# Patient Record
Sex: MALE | Employment: OTHER | URBAN - METROPOLITAN AREA
[De-identification: names, ages, dates, MRNs, and addresses within clinical notes are randomized per-mention and may not be internally consistent; named-entity substitution may affect disease eponyms.]

---

## 2019-02-21 PROBLEM — Z79.4 TYPE 2 DIABETES MELLITUS WITH MICROALBUMINURIA, WITH LONG-TERM CURRENT USE OF INSULIN (HCC): Status: ACTIVE | Noted: 2019-02-21

## 2019-02-21 PROBLEM — R80.9 TYPE 2 DIABETES MELLITUS WITH MICROALBUMINURIA, WITH LONG-TERM CURRENT USE OF INSULIN (HCC): Status: ACTIVE | Noted: 2019-02-21

## 2019-02-21 PROBLEM — E11.29 TYPE 2 DIABETES MELLITUS WITH MICROALBUMINURIA, WITH LONG-TERM CURRENT USE OF INSULIN (HCC): Status: ACTIVE | Noted: 2019-02-21

## 2019-07-24 PROBLEM — H40.89 OTHER SPECIFIED GLAUCOMA: Status: ACTIVE | Noted: 2019-07-24

## 2019-07-24 PROBLEM — S72.91XA CLOSED FRACTURE OF RIGHT FEMUR (HCC): Status: ACTIVE | Noted: 2019-07-24

## 2019-07-24 PROBLEM — E11.65 TYPE 2 DIABETES MELLITUS WITH HYPERGLYCEMIA, WITH LONG-TERM CURRENT USE OF INSULIN (HCC): Status: ACTIVE | Noted: 2019-02-21

## 2019-07-24 PROBLEM — Z79.4 TYPE 2 DIABETES MELLITUS WITH HYPERGLYCEMIA, WITH LONG-TERM CURRENT USE OF INSULIN (HCC): Status: ACTIVE | Noted: 2019-02-21

## 2019-07-24 PROBLEM — E03.8 OTHER SPECIFIED HYPOTHYROIDISM: Status: ACTIVE | Noted: 2019-07-24

## 2019-07-24 PROBLEM — I10 ESSENTIAL HYPERTENSION: Status: ACTIVE | Noted: 2019-07-24

## 2020-01-18 PROBLEM — R07.9 CHEST PAIN, UNSPECIFIED TYPE: Status: ACTIVE | Noted: 2020-01-18

## 2020-03-20 PROBLEM — I73.9 PERIPHERAL ARTERY DISEASE: Status: ACTIVE | Noted: 2020-03-20

## 2020-03-20 PROBLEM — L97.511 ULCER OF FOOT, RIGHT, LIMITED TO BREAKDOWN OF SKIN (HCC): Status: ACTIVE | Noted: 2020-03-20

## 2020-03-20 PROBLEM — L97.909 ATHEROSCLEROSIS OF ARTERY OF EXTREMITY WITH ULCERATION (HCC): Status: ACTIVE | Noted: 2020-03-20

## 2020-03-20 PROBLEM — E11.51 TYPE 2 DIABETES MELLITUS WITH PERIPHERAL ANGIOPATHY (HCC): Status: ACTIVE | Noted: 2020-03-20

## 2020-03-20 PROBLEM — I73.9 PERIPHERAL ARTERY DISEASE (HCC): Status: ACTIVE | Noted: 2020-03-20

## 2020-03-20 PROBLEM — I70.299 ATHEROSCLEROSIS OF ARTERY OF EXTREMITY WITH ULCERATION (HCC): Status: ACTIVE | Noted: 2020-03-20

## 2020-03-23 ENCOUNTER — OFFICE VISIT (OUTPATIENT)
Dept: WOUND CARE | Facility: HOSPITAL | Age: 80
End: 2020-03-23
Attending: SURGERY
Payer: MEDICARE

## 2020-03-23 DIAGNOSIS — L97.412 DIABETIC ULCER OF RIGHT MIDFOOT ASSOCIATED WITH TYPE 2 DIABETES MELLITUS, WITH FAT LAYER EXPOSED (HCC): ICD-10-CM

## 2020-03-23 DIAGNOSIS — E11.621 DIABETIC ULCER OF RIGHT MIDFOOT ASSOCIATED WITH TYPE 2 DIABETES MELLITUS, WITH FAT LAYER EXPOSED (HCC): ICD-10-CM

## 2020-03-23 DIAGNOSIS — L97.511 ULCER OF FOOT, RIGHT, LIMITED TO BREAKDOWN OF SKIN (HCC): Primary | ICD-10-CM

## 2020-03-23 PROCEDURE — 99215 OFFICE O/P EST HI 40 MIN: CPT

## 2020-03-23 NOTE — PROGRESS NOTES
Subjective    Chief Complaint  This information was obtained from the patient  The patient is new to the [address] [facility],[address] here for an initial visit for the evaluation and management of non-healing wound(s).     Allergies  Statins-Hmg-Coa Reductase Inhibitors (R Vascular surgery Dr. Gracie Fairbanks noted the discussion with patient and his son, Tiffany Garcias unfortunately has no bypass options given that he has no outflow or major named vessels in his foot. Raz Camacho If the wound progresses or his pain is unbearable, unfortunately his only Psychiatric: Mental Illness, Memory Loss, Suicidal    Additional Information  Little interest or pleasure in doing things (over the last 2 weeks)?: 0  Feeling down, depressed, or hopeless (over the last 2 weeks)?: 0  Total score: : 0    Medications  diazep The periwound skin did not exhibit: Brawny Induration, Edema, Excoriation, Induration, Callus, Crepitus, Fluctuance, Friable, Rash, Dry/Scaly, Moist, Maceration, Atrophie Chayo, Cyanosis, Ecchymosis, Erythema, Hemosiderosis, Pallor, Rubor.  The temperatur Gastrointestinal (GI):  Positive bowel sounds. Soft, nondistended, nontender. No guarding . Lymphatic:  no palpable lymph nodes in neck or axillae. Musculoskeletal:  Normal gait.  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 Increase dietary protein intake. Exercise as tolerated. Decrease salt intake. Moisturizer.   S/S of infection - monitor for S&S of soft tissue infection such as fever, chills, erythema, inc Discussed nutrition for wound healing and encourage reduce carbohydrate intake, increase protein intake, and low calorie diet. Discussed reducing salt intake, food with hidden salt, and management of the body fluid to prevent lower extremity edema.   Discu

## 2020-03-25 ENCOUNTER — APPOINTMENT (OUTPATIENT)
Dept: WOUND CARE | Facility: HOSPITAL | Age: 80
End: 2020-03-25
Attending: NURSE PRACTITIONER

## 2020-04-02 ENCOUNTER — OFFICE VISIT (OUTPATIENT)
Dept: WOUND CARE | Facility: HOSPITAL | Age: 80
End: 2020-04-02
Attending: NURSE PRACTITIONER
Payer: MEDICARE

## 2020-04-02 DIAGNOSIS — L97.412 DIABETIC ULCER OF RIGHT MIDFOOT ASSOCIATED WITH TYPE 2 DIABETES MELLITUS, WITH FAT LAYER EXPOSED (HCC): ICD-10-CM

## 2020-04-02 DIAGNOSIS — E11.621 DIABETIC ULCER OF RIGHT MIDFOOT ASSOCIATED WITH TYPE 2 DIABETES MELLITUS, WITH FAT LAYER EXPOSED (HCC): ICD-10-CM

## 2020-04-02 DIAGNOSIS — L97.511 ULCER OF FOOT, RIGHT, LIMITED TO BREAKDOWN OF SKIN (HCC): Primary | ICD-10-CM

## 2020-04-02 PROCEDURE — 11055 PARING/CUTG B9 HYPRKER LES 1: CPT

## 2020-04-02 NOTE — PROGRESS NOTES
Subjective    Chief Complaint  This information was obtained from the patient  The patient was seen today for follow up and management of difficult to heal wound rioght foot and great toe wounds.     Allergies  Statins-Hmg-Coa Reductase Inhibitors (Reaction Vascular surgery Dr. Marshall Snowden noted the discussion with patient and his son, Shelby Guo unfortunately has no bypass options given that he has no outflow or major named vessels in his foot. Meli Lopez If the wound progresses or his pain is unbearable, unfortunately his only Wound #1 Right, Lateral, Plantar Foot is a Plata Grade 2 Diabetic Ulcer and has received a status of Not Healed.  Subsequent wound encounter measurements are 0.2cm length x 0.2cm width x 0.1cm depth, with an area of 0.04 sq cm and a volume of 0.004 cubic c Constitutional  well developed, no acute distress. Respiratory:  effortless breathing. Cardiovascular:  BLE's with no edema. Musculoskeletal:  no clubbing, no cyanosis of digits and nails.     Integumentary (Hair, Skin)  Skin normal color, texture Wound #1 (Diabetic Ulcer) is located on the right, lateral, plantar foot. A Single Paring Of Callus/Hyperkeratotic Tissue procedure was performed by Jeris Cowden, FNP-C. Pain control was achieved using N/A.  A time out was conducted prior to the start of t Patient Name: Angela Salvador   Patient Number: [id number]  Patient YOB: 1940  Date: 4/2/2020  RN: Elizabeth Raya  Physician / Extender: Sanna Pompa  Procedure Performed for: Wound #1 Right, Lateral, Plantar Foot  Performed By: Physician Calista Rinne

## 2020-04-16 ENCOUNTER — OFFICE VISIT (OUTPATIENT)
Dept: WOUND CARE | Facility: HOSPITAL | Age: 80
End: 2020-04-16
Attending: NURSE PRACTITIONER
Payer: MEDICARE

## 2020-04-16 DIAGNOSIS — L97.412 DIABETIC ULCER OF RIGHT MIDFOOT ASSOCIATED WITH TYPE 2 DIABETES MELLITUS, WITH FAT LAYER EXPOSED (HCC): Primary | ICD-10-CM

## 2020-04-16 DIAGNOSIS — E11.621 DIABETIC ULCER OF RIGHT MIDFOOT ASSOCIATED WITH TYPE 2 DIABETES MELLITUS, WITH FAT LAYER EXPOSED (HCC): Primary | ICD-10-CM

## 2020-04-16 PROCEDURE — 99213 OFFICE O/P EST LOW 20 MIN: CPT

## 2020-04-16 NOTE — PROGRESS NOTES
Subjective    Chief Complaint  This information was obtained from the patient  The patient was seen today for follow up and management of difficult to heal wound right foot and great toe wounds.     Allergies  Statins-Hmg-Coa Reductase Inhibitors (Reaction: Vascular surgery Dr. Clarissa Pretty noted the discussion with patient and his son, Montserrat Moon unfortunately has no bypass options given that he has no outflow or major named vessels in his foot. Amanda Cantu If the wound progresses or his pain is unbearable, unfortunately his only Wound #1 Right, Lateral, Plantar Foot is a Plata Grade 2 Diabetic Ulcer and has received a status of Not Healed.  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 Wound Treatment Note  Assessed patient’s pain status and effectiveness

## 2020-04-30 ENCOUNTER — APPOINTMENT (OUTPATIENT)
Dept: WOUND CARE | Facility: HOSPITAL | Age: 80
End: 2020-04-30
Payer: MEDICARE

## 2020-05-14 ENCOUNTER — OFFICE VISIT (OUTPATIENT)
Dept: WOUND CARE | Facility: HOSPITAL | Age: 80
End: 2020-05-14
Attending: NURSE PRACTITIONER
Payer: MEDICARE

## 2020-05-14 DIAGNOSIS — E11.621 DIABETIC ULCER OF RIGHT MIDFOOT ASSOCIATED WITH TYPE 2 DIABETES MELLITUS, WITH FAT LAYER EXPOSED (HCC): Primary | ICD-10-CM

## 2020-05-14 DIAGNOSIS — L97.412 DIABETIC ULCER OF RIGHT MIDFOOT ASSOCIATED WITH TYPE 2 DIABETES MELLITUS, WITH FAT LAYER EXPOSED (HCC): Primary | ICD-10-CM

## 2020-05-14 PROCEDURE — 99213 OFFICE O/P EST LOW 20 MIN: CPT

## 2020-05-14 NOTE — PROGRESS NOTES
Subjective    Chief Complaint  This information was obtained from the patient  The patient was seen today for follow up and management of difficult to heal wound right foot wound. No concerns.     Allergies  Statins-Hmg-Coa Reductase Inhibitors (Reaction: I Vascular surgery Dr. Jeffery Kirk noted the discussion with patient and his son, Rosa Davison unfortunately has no bypass options given that he has no outflow or major named vessels in his foot. Binu Roughen Binu Jossue Binuronan Marcum If the wound progresses or his pain is unbearable, unfortunately his only Height/Length: 64 in (162.56 cm), Weight: 120 lbs (54.55 kgs), BMI: 20.6, Temperature: 96.6 °F (35.89 °C), Pulse: 59 bpm, Respiratory Rate: 18 breaths/min, Blood Pressure: 161/63 mmHg, Pulse Oximetry: 100 %.   Vitals Signs Notes: patient did not check his b Moisturizer.  - legs daily  S/S of infection    Electronic Signature(s)  Signed By: Date:  Jeris Cowden FNP-BC 05/14/2020 3:14:11 PM       Entered By: Jeris Cowden on 05/14/2020 3:13:43 PM  Treatment Notes Summary  Wound #1 (Right, Lateral, Plantar Foot)

## 2020-06-11 ENCOUNTER — OFFICE VISIT (OUTPATIENT)
Dept: WOUND CARE | Facility: HOSPITAL | Age: 80
End: 2020-06-11
Attending: NURSE PRACTITIONER
Payer: MEDICARE

## 2020-06-11 DIAGNOSIS — E11.621 DIABETIC ULCER OF RIGHT MIDFOOT ASSOCIATED WITH TYPE 2 DIABETES MELLITUS, WITH FAT LAYER EXPOSED (HCC): Primary | ICD-10-CM

## 2020-06-11 DIAGNOSIS — L97.412 DIABETIC ULCER OF RIGHT MIDFOOT ASSOCIATED WITH TYPE 2 DIABETES MELLITUS, WITH FAT LAYER EXPOSED (HCC): Primary | ICD-10-CM

## 2020-06-11 PROCEDURE — 99212 OFFICE O/P EST SF 10 MIN: CPT

## 2020-06-11 NOTE — PROGRESS NOTES
Subjective    Chief Complaint  This information was obtained from the patient  The patient was seen today for a consult of right great toenail pain.     Allergies  Statins-Hmg-Coa Reductase Inhibitors (Reaction: Itching and Swelling), Sulfa antibiotics    H posterior tibial artery is now patent post intervention where visualized with a increase in the great toe pressure (previous: 38mmHg and TBI: 0.17) compared to the exam done on 02/25/2020.     Vascular surgery Dr. Neal Patel noted the discussion with patient a Gastrointestinal (GI): Loss of Appetite, Nausea / Vomiting / Diarrhea (N/V/D)  Genitourinary (): Urinary Incontinence  Musculoskeletal: Muscle Weakness  Integumentary (Hair/Skin/Nails): Itching  Neurological: Loss of Protective Sensation, Memory Loss  En Appropriate judgement and insight. Oriented to time, place and person. Appropriate mood and affect.         Assessment    Active Problems    ICD-10  (Encounter Diagnosis) E11.621 - Type 2 diabetes mellitus with foot ulcer  (Encounter Diagnosis) I70.234 - At

## 2020-06-22 PROBLEM — R26.81 UNSTEADY GAIT WHEN WALKING: Status: ACTIVE | Noted: 2020-06-22

## 2020-06-22 PROBLEM — E87.1 HYPONATREMIA: Status: ACTIVE | Noted: 2020-06-22

## 2020-09-02 PROBLEM — E11.69 MIXED HYPERLIPIDEMIA DUE TO TYPE 2 DIABETES MELLITUS (HCC): Status: ACTIVE | Noted: 2020-09-02

## 2020-09-02 PROBLEM — E78.2 MIXED HYPERLIPIDEMIA DUE TO TYPE 2 DIABETES MELLITUS (HCC): Status: ACTIVE | Noted: 2020-09-02

## 2020-09-09 PROBLEM — L97.412 DIABETIC ULCER OF RIGHT MIDFOOT ASSOCIATED WITH TYPE 2 DIABETES MELLITUS, WITH FAT LAYER EXPOSED (HCC): Status: RESOLVED | Noted: 2020-03-23 | Resolved: 2020-09-09

## 2020-09-09 PROBLEM — S72.91XA CLOSED FRACTURE OF RIGHT FEMUR (HCC): Status: RESOLVED | Noted: 2019-07-24 | Resolved: 2020-09-09

## 2020-09-09 PROBLEM — E11.621 DIABETIC ULCER OF RIGHT MIDFOOT ASSOCIATED WITH TYPE 2 DIABETES MELLITUS, WITH FAT LAYER EXPOSED (HCC): Status: RESOLVED | Noted: 2020-03-23 | Resolved: 2020-09-09

## 2020-09-09 PROBLEM — Z00.00 ROUTINE GENERAL MEDICAL EXAMINATION AT A HEALTH CARE FACILITY: Status: ACTIVE | Noted: 2020-09-09

## 2020-09-09 PROBLEM — L97.511 ULCER OF FOOT, RIGHT, LIMITED TO BREAKDOWN OF SKIN (HCC): Status: RESOLVED | Noted: 2020-03-20 | Resolved: 2020-09-09

## 2020-11-03 PROBLEM — E78.5 HYPERLIPIDEMIA, UNSPECIFIED HYPERLIPIDEMIA TYPE: Status: ACTIVE | Noted: 2020-11-03

## 2020-11-03 PROBLEM — E11.51 TYPE 2 DIABETES MELLITUS WITH PERIPHERAL ANGIOPATHY (HCC): Status: RESOLVED | Noted: 2020-03-20 | Resolved: 2020-11-03

## 2020-12-13 PROBLEM — R80.9 TYPE 2 DIABETES MELLITUS WITH MICROALBUMINURIA (HCC): Status: ACTIVE | Noted: 2020-12-13

## 2020-12-13 PROBLEM — E11.29 TYPE 2 DIABETES MELLITUS WITH MICROALBUMINURIA (HCC): Status: ACTIVE | Noted: 2020-12-13

## 2020-12-13 PROBLEM — E11.51 TYPE 2 DIABETES MELLITUS WITH DIABETIC PERIPHERAL ANGIOPATHY WITHOUT GANGRENE (HCC): Status: ACTIVE | Noted: 2020-12-13

## 2020-12-14 PROBLEM — R53.1 WEAKNESS: Status: ACTIVE | Noted: 2020-12-14

## 2020-12-17 PROBLEM — E11.69 MIXED HYPERLIPIDEMIA DUE TO TYPE 2 DIABETES MELLITUS (HCC): Status: RESOLVED | Noted: 2020-09-02 | Resolved: 2020-12-17

## 2020-12-17 PROBLEM — E78.2 MIXED HYPERLIPIDEMIA DUE TO TYPE 2 DIABETES MELLITUS (HCC): Status: RESOLVED | Noted: 2020-09-02 | Resolved: 2020-12-17

## 2020-12-17 PROBLEM — E11.51 TYPE 2 DIABETES MELLITUS WITH DIABETIC PERIPHERAL ANGIOPATHY WITHOUT GANGRENE (HCC): Status: RESOLVED | Noted: 2020-12-13 | Resolved: 2020-12-17

## 2020-12-17 PROBLEM — R80.9 TYPE 2 DIABETES MELLITUS WITH MICROALBUMINURIA (HCC): Status: RESOLVED | Noted: 2020-12-13 | Resolved: 2020-12-17

## 2020-12-17 PROBLEM — R80.9 MODERATELY INCREASED ALBUMINURIA: Status: ACTIVE | Noted: 2020-12-17

## 2020-12-17 PROBLEM — E11.29 TYPE 2 DIABETES MELLITUS WITH MICROALBUMINURIA (HCC): Status: RESOLVED | Noted: 2020-12-13 | Resolved: 2020-12-17

## 2021-03-01 PROBLEM — E03.9 HYPOTHYROIDISM, UNSPECIFIED TYPE: Status: ACTIVE | Noted: 2019-07-24

## 2021-03-01 PROBLEM — H10.33 ACUTE CONJUNCTIVITIS OF BOTH EYES, UNSPECIFIED ACUTE CONJUNCTIVITIS TYPE: Status: ACTIVE | Noted: 2021-03-01

## 2021-03-01 PROBLEM — I73.9 PAD (PERIPHERAL ARTERY DISEASE): Status: ACTIVE | Noted: 2020-03-20

## 2021-03-01 PROBLEM — I73.9 PAD (PERIPHERAL ARTERY DISEASE) (HCC): Status: ACTIVE | Noted: 2020-03-20

## 2021-03-01 PROBLEM — H90.3 SENSORINEURAL HEARING LOSS (SNHL) OF BOTH EARS: Status: ACTIVE | Noted: 2021-03-01

## 2021-03-05 DIAGNOSIS — Z23 NEED FOR VACCINATION: ICD-10-CM

## 2021-04-28 PROBLEM — R53.83 FATIGUE, UNSPECIFIED TYPE: Status: ACTIVE | Noted: 2020-12-14

## 2021-04-28 PROBLEM — K59.00 CONSTIPATION, UNSPECIFIED CONSTIPATION TYPE: Status: ACTIVE | Noted: 2021-04-28

## 2021-06-09 PROBLEM — Z09 HOSPITAL DISCHARGE FOLLOW-UP: Status: ACTIVE | Noted: 2021-06-09

## 2021-09-30 PROBLEM — M54.50 CHRONIC MIDLINE LOW BACK PAIN WITHOUT SCIATICA: Status: ACTIVE | Noted: 2021-09-30

## 2021-09-30 PROBLEM — E46 PROTEIN-CALORIE MALNUTRITION, UNSPECIFIED SEVERITY (HCC): Status: ACTIVE | Noted: 2021-09-30

## 2021-09-30 PROBLEM — Z00.00 ROUTINE GENERAL MEDICAL EXAMINATION AT A HEALTH CARE FACILITY: Status: RESOLVED | Noted: 2020-09-09 | Resolved: 2021-09-30

## 2021-09-30 PROBLEM — E55.9 VITAMIN D DEFICIENCY: Status: ACTIVE | Noted: 2021-09-30

## 2021-09-30 PROBLEM — G89.29 CHRONIC MIDLINE LOW BACK PAIN WITHOUT SCIATICA: Status: ACTIVE | Noted: 2021-09-30

## 2021-12-06 PROBLEM — R80.9 MICROALBUMINURIA: Status: ACTIVE | Noted: 2020-12-17

## 2022-02-24 PROBLEM — D64.9 ANEMIA, UNSPECIFIED TYPE: Status: ACTIVE | Noted: 2022-02-24

## 2023-05-30 ENCOUNTER — HOSPITAL ENCOUNTER (OUTPATIENT)
Dept: INTERVENTIONAL RADIOLOGY/VASCULAR | Facility: HOSPITAL | Age: 83
Discharge: HOME | End: 2023-05-30
Attending: SURGERY | Admitting: SURGERY
Payer: MEDICARE

## 2023-05-30 VITALS
RESPIRATION RATE: 14 BRPM | HEART RATE: 66 BPM | DIASTOLIC BLOOD PRESSURE: 47 MMHG | SYSTOLIC BLOOD PRESSURE: 179 MMHG | TEMPERATURE: 97 F | OXYGEN SATURATION: 98 %

## 2023-05-30 DIAGNOSIS — I73.9 PVD (PERIPHERAL VASCULAR DISEASE) (HCC): ICD-10-CM

## 2023-05-30 LAB — GLUCOSE BLD-MCNC: 93 MG/DL (ref 70–99)

## 2023-05-30 PROCEDURE — 047L3Z1 DILATION OF LEFT FEMORAL ARTERY USING DRUG-COATED BALLOON, PERCUTANEOUS APPROACH: ICD-10-PCS | Performed by: SURGERY

## 2023-05-30 PROCEDURE — 37228 HC TRANSLUMINAL ANGIO TIBIAL PERONEAL UNILAT INIT: CPT | Performed by: SURGERY

## 2023-05-30 PROCEDURE — 99153 MOD SED SAME PHYS/QHP EA: CPT | Performed by: SURGERY

## 2023-05-30 PROCEDURE — 047Q3ZZ DILATION OF LEFT ANTERIOR TIBIAL ARTERY, PERCUTANEOUS APPROACH: ICD-10-PCS | Performed by: SURGERY

## 2023-05-30 PROCEDURE — 047N3Z1 DILATION OF LEFT POPLITEAL ARTERY USING DRUG-COATED BALLOON, PERCUTANEOUS APPROACH: ICD-10-PCS | Performed by: SURGERY

## 2023-05-30 PROCEDURE — 37224 HC TRANSLUMINAL ANGIOPLASTY FEM POP UNILATERAL: CPT | Performed by: SURGERY

## 2023-05-30 PROCEDURE — 99152 MOD SED SAME PHYS/QHP 5/>YRS: CPT | Performed by: SURGERY

## 2023-05-30 PROCEDURE — 82962 GLUCOSE BLOOD TEST: CPT

## 2023-05-30 PROCEDURE — 75710 ARTERY X-RAYS ARM/LEG: CPT | Performed by: SURGERY

## 2023-05-30 RX ORDER — SODIUM CHLORIDE 9 MG/ML
INJECTION, SOLUTION INTRAVENOUS CONTINUOUS
Status: DISCONTINUED | OUTPATIENT
Start: 2023-05-30 | End: 2023-05-30

## 2023-05-30 RX ORDER — TRAMADOL HYDROCHLORIDE 50 MG/1
TABLET ORAL EVERY 4 HOURS PRN
Qty: 30 TABLET | Refills: 0 | Status: SHIPPED | OUTPATIENT
Start: 2023-05-30

## 2023-05-30 RX ORDER — HYDROCODONE BITARTRATE AND ACETAMINOPHEN 5; 325 MG/1; MG/1
1 TABLET ORAL EVERY 4 HOURS PRN
Status: DISCONTINUED | OUTPATIENT
Start: 2023-05-30 | End: 2023-05-30

## 2023-05-30 RX ORDER — HYDROCODONE BITARTRATE AND ACETAMINOPHEN 5; 325 MG/1; MG/1
2 TABLET ORAL EVERY 4 HOURS PRN
Status: DISCONTINUED | OUTPATIENT
Start: 2023-05-30 | End: 2023-05-30

## 2023-05-30 RX ORDER — IODIXANOL 320 MG/ML
100 INJECTION, SOLUTION INTRAVASCULAR
Status: COMPLETED | OUTPATIENT
Start: 2023-05-30 | End: 2023-05-30

## 2023-05-30 RX ORDER — AMLODIPINE BESYLATE 5 MG/1
5 TABLET ORAL DAILY
COMMUNITY

## 2023-05-30 RX ORDER — LIDOCAINE HYDROCHLORIDE 10 MG/ML
INJECTION, SOLUTION EPIDURAL; INFILTRATION; INTRACAUDAL; PERINEURAL
Status: COMPLETED
Start: 2023-05-30 | End: 2023-05-30

## 2023-05-30 RX ORDER — CLOPIDOGREL BISULFATE 75 MG/1
TABLET ORAL
Status: COMPLETED
Start: 2023-05-30 | End: 2023-05-30

## 2023-05-30 RX ORDER — HEPARIN SODIUM 5000 [USP'U]/ML
INJECTION, SOLUTION INTRAVENOUS; SUBCUTANEOUS
Status: COMPLETED
Start: 2023-05-30 | End: 2023-05-30

## 2023-05-30 RX ORDER — PROTAMINE SULFATE 10 MG/ML
INJECTION, SOLUTION INTRAVENOUS
Status: COMPLETED
Start: 2023-05-30 | End: 2023-05-30

## 2023-05-30 RX ORDER — MIDAZOLAM HYDROCHLORIDE 1 MG/ML
INJECTION INTRAMUSCULAR; INTRAVENOUS
Status: COMPLETED
Start: 2023-05-30 | End: 2023-05-30

## 2023-05-30 RX ORDER — ACETAMINOPHEN 325 MG/1
650 TABLET ORAL EVERY 4 HOURS PRN
Status: DISCONTINUED | OUTPATIENT
Start: 2023-05-30 | End: 2023-05-30

## 2023-05-30 RX ORDER — CLOPIDOGREL BISULFATE 75 MG/1
75 TABLET ORAL DAILY
Qty: 90 TABLET | Refills: 3 | Status: SHIPPED | OUTPATIENT
Start: 2023-05-30 | End: 2024-05-24

## 2023-05-30 RX ADMIN — IODIXANOL 110 ML: 320 INJECTION, SOLUTION INTRAVASCULAR at 10:35:00

## 2023-05-30 RX ADMIN — SODIUM CHLORIDE: 9 INJECTION, SOLUTION INTRAVENOUS at 11:00:00

## 2023-05-30 NOTE — OPERATIVE REPORT
Vascular Surgery Cath Lab Operative Note  Patients Name:  Gino Huddleston  Operating Physician: Travis Maurice MD  Location:  Cath Lab  MRN:   [id number]                                            YOB: 1940    Operation Date:  May 30, 2023           Pre-Operative Diagnosis:   Atherosclerosis with severe short distance claudication     Post-Operative Diagnosis:   Same     Procedure Performed:   1. Ultrasound guided access of the right common femoral artery  2. Left lower extremity 3rd order angiogram  3. Left SFA DCB angioplasty with 6x250 In. Pact  4. Left popliteal DCB angioplasty with 6x100 Lutonix   5. AT angioplasty with 2x80 and 1.5x20 balloon   6. Radiographic supervision and interpretation    Anesthesia:   Conscious Sedation: 4mg Versed 150mcg Fentanyl     Start Time: 0902  Finish Time: 1020     EBL: Minimal      Complications: None apparent     Findings: Moderate disease in the common femoral.  Tight stenosis of the proximal and distal SFA. Entire SFA is heavily calcified. Short segment occlusion of the popliteal artery above the knee. Single-vessel runoff to the foot via the AT which only gives branches into the foot and not a true DP. The origin of the peroneal was occluded. The posterior tibial occludes shortly after takeoff. Following intervention the SFA is widely patent with a widely patent AT. There is an AT signal at the ankle. Indication for Procedure: Leeann Granger is an 82-year-old male who presents with severe pain of the toes of the left foot. This has been present for several weeks. On duplex ultrasound he was found to have a stenosis of the SFA distally and proximally. He presents today for a left lower extremity angiogram with possible intervention. Risks and benefits of the procedure were explained to the patient and he elects to proceed. Description of Procedure: The patient was brought to the operating room, he was placed supine on the operating table.   He was sedated and monitored by an independent practitioner under my supervision. His bilateral groins were prepped and draped in usual sterile fashion. Under ultrasound guidance 1% lidocaine was infiltrated above the right common femoral artery. The right common femoral artery was then accessed using a micropuncture needle, wire and sheath. This was exchanged over an 035 braided guidewire for a short 5 Western Shu sheath. A glide advantage wire and an Omni Flush catheter were inserted to the infrarenal abdominal aorta. The left common iliac artery was selected. The wire and catheter were advanced level of the distal external iliac artery and a left lower extremity angiogram was performed which showed the findings above. The wire was then advanced into the SFA and the sheath was exchanged for a 6 x 45 Terumo destination sheath. The patient was then fully heparinized. Heparin was given throughout the case to keep the ACT greater than 200. A quick cross catheter was inserted over the wire and the SFA occlusion was crossed. Angiogram through the catheter confirmed true lumen placement. It also demonstrated some focal disease within the AT. All vessels were extremely heavily calcified. An 018 glide advantage wire was then placed. A 6 x 100 mm drug-coated balloon was placed in the above-knee popliteal artery and balloon angioplasty was performed with resolution of the waist.  The wire was then advanced to the distal AT. Given the heavy calcification I had to exchanged to a gladius wire. This was done using an 018 quick cross catheter. I attempted balloon angioplasty using a 2 x 80 mm balloon but could only get it into the proximal AT. A 1.5 mm balloon had to be inserted and the AT from the ankle up was angioplastied. I did attempt to get into the peroneal however the origin was completely occluded and the posterior tibial did not reconstitute at the ankle.   Repeat angiogram did demonstrate that the proximal popliteal artery was restenosed. I ballooned this area using a 6 x 60 mm L[place]ay balloon with good resolution of the waist.  The remainder of the SFA did appear diffusely diseased as such a 6 x 250 mm drug-coated balloon was inserted and the entire SFA was angioplastied into the common femoral.  Completion angiography confirmed a widely patent SFA popliteal and anterior tibial artery. At this point the patient was felt to be maximally revascularized. All wires and catheters were removed. The 6 Romansh sheath was exchanged for a short 6 Romansh sheath and a 6/7 mynx device was used to close the arteriotomy in the groin. Access site angiography did confirm good placement within the mid common femoral artery. Manual pressure was then held. Protamine was given to reverse the effects of heparin and a sterile dressing was applied. The patient tolerated procedure well there are no immediate complications. He was taken to the postanesthesia care unit in good condition.     Plan: Bedrest x2 hours   Plavix load and daily plavix   Follow-up in one month        Reynaldo Winchester MD  05/30/23  10:37 AM No

## 2023-05-30 NOTE — IVS NOTE
Patient discharged home post PV angio. Pt is able to sit up and ambulate without difficulty. Pt's vital signs are stable. Right groin procedural access site is dry and intact with no signs and symptoms of bleeding or hematoma. Pt tolerated food/fluids. Dr. Joshua Escobedo spoke to pt/family post procedure. Instructions provided and pt/family verbalized understanding. PIV removed. Patient discharged via wheelchair with all belongings.

## 2025-02-07 ENCOUNTER — OFFICE VISIT (OUTPATIENT)
Dept: WOUND CARE | Facility: HOSPITAL | Age: 85
End: 2025-02-07
Attending: NURSE PRACTITIONER
Payer: MEDICARE

## 2025-02-07 VITALS
WEIGHT: 129 LBS | DIASTOLIC BLOOD PRESSURE: 74 MMHG | OXYGEN SATURATION: 98 % | HEART RATE: 64 BPM | HEIGHT: 64 IN | BODY MASS INDEX: 22.02 KG/M2 | TEMPERATURE: 98 F | SYSTOLIC BLOOD PRESSURE: 213 MMHG

## 2025-02-07 DIAGNOSIS — L97.511 DIABETIC ULCER OF TOE OF RIGHT FOOT ASSOCIATED WITH DIABETES MELLITUS DUE TO UNDERLYING CONDITION, LIMITED TO BREAKDOWN OF SKIN (HCC): Primary | ICD-10-CM

## 2025-02-07 DIAGNOSIS — E08.621 DIABETIC ULCER OF TOE OF RIGHT FOOT ASSOCIATED WITH DIABETES MELLITUS DUE TO UNDERLYING CONDITION, LIMITED TO BREAKDOWN OF SKIN (HCC): Primary | ICD-10-CM

## 2025-02-07 PROCEDURE — 99212 OFFICE O/P EST SF 10 MIN: CPT

## 2025-02-07 NOTE — PROGRESS NOTES
Subjective   Pat Mak is a 84 year old male.    Chief Complaint   Patient presents with    Wound Care     New patient here for right great toe wound care. Pt states toe nail was removed 2 weeks ago. Pt states Bs are at 96 this morning.      HPI  2/7/2025: Patient is a 84 year old male with type two diabetes and PVD, he developed ulceration on his right foot about 3 weeks ago. The great toe wound was debrided by podiatrist Dr. Frazier, he noted underlying exposed nailbed, and ulceration present which upon debridement demonstrates a full-thickness ulceration with exposed distal phalanx dorsally. Patient's family assisting with dressing changes, daily betadine with gauze.   1/15/2025: A1c 9.2    Review of Systems   Constitutional:  Negative for activity change, chills, fatigue and fever.   HENT:  Negative for congestion.    Eyes:  Positive for visual disturbance.   Respiratory:  Negative for cough and shortness of breath.    Cardiovascular:  Positive for leg swelling. Negative for chest pain.   Gastrointestinal:  Negative for abdominal pain.   Musculoskeletal:  Positive for arthralgias and gait problem.   Skin:  Positive for wound.        Right great toe wound   Neurological:  Negative for weakness.   Psychiatric/Behavioral:  Negative for agitation.      Objective   Physical Exam  Constitutional:       Appearance: Normal appearance.   HENT:      Head: Normocephalic.   Cardiovascular:      Rate and Rhythm: Normal rate and regular rhythm.      Pulses: Normal pulses.      Heart sounds: Normal heart sounds.   Pulmonary:      Effort: Pulmonary effort is normal.      Breath sounds: Normal breath sounds.   Abdominal:      General: Bowel sounds are normal.   Musculoskeletal:      Cervical back: Normal range of motion and neck supple.      Right lower leg: Edema present.      Left lower leg: Edema present.        Feet:    Feet:      Right foot:      Skin integrity: Ulcer present.      Toenail Condition: Fungal disease  present.  Skin:     General: Skin is warm and dry.      Findings: No erythema or rash.   Neurological:      Mental Status: He is alert and oriented to person, place, and time.   Psychiatric:         Mood and Affect: Mood normal.         Behavior: Behavior normal.     Wound Assessment  Wound 02/07/25 1 Toe (Comment which one) Right;Dorsal (Active)   Date First Assessed/Time First Assessed: 02/07/25 1137    Wound Number (Wound Clinic Only): 1  Location: (c) Toe (Comment which one)  Wound Location Orientation: Right;Dorsal  Wound Description (Comments): Great toe      Assessments 2/7/2025 11:40 AM   Wound Image     Site Assessment Clean;Dry;Intact;Epithelialization   Closure Approximated   Drainage Amount None   Treatments Cleansed;Saline;Wound ;Topical (Barrier/Moisturizer/Ointment);Betadine   Dressing 2x2s;Tape   Dressing Changed New   Dressing Status Removed   Wound Length (cm) 0 cm   Wound Width (cm) 0 cm   Wound Surface Area (cm^2) 0 cm^2   Wound Depth (cm) 0 cm   Wound Volume (cm^3) 0 cm^3   Margins Flat and Intact   Non-staged Wound Description Not applicable   Holly-wound Assessment Clean;Dry;Intact   Wound Bed Granulation (%) 0 %   Wound Bed Epithelium (%) 100 %   Wound Bed Slough (%) 0 %   Wound Bed Eschar (%) 0 %   Wound Bed Fibrin (%) 0 %   State of Healing Closed wound edges;Epithelialized   Wound Odor None       Active Orders   Date Order Priority Status Authorizing Provider   02/07/25 1218 OP Wound Dressing Routine Active Eduardo Burk APRN     - Cleansing:    Cleanse with normal saline or wound cleanser     - Dressing:    Betadine     - Additional Wound Dressing Information:    gauze, medipore tape     - Frequency:    Change dressing daily and PRN       Vital Signs    02/07/25 1135 02/07/25 1145   BP: (!) 217/72 (!) 213/74   Pulse: 64    Temp: 98.3 °F (36.8 °C)    PainSc: 3 - (Mild)    PainLoc: Toe    BP elevated (it could be vascular issue), no chest pain, no double vision, no symptoms, f/u with  PCP  Allergies  Allergies[1]  Assessment   Right great toe wound, diabetic foot ulcer, stage 3 pressure injury:  -nail be is closed, stained with betadine  -no slough   -no erythema or other S&S of soft tissue infection  Warm feet with less than 3 sec cap refill, biphasic wave sounds on hand held doppler indicating adequate circulation to heal these wounds.    Reviewed note, lab, imaging in [facility].  Recent labs:  1/15/2025: A1c 9.2  1/14/2025: BUN 40, creatinine 1.21, eGFR 59.8, H&H 11.2&34.4, vitamin D 58.91      2/29/2024:Multilevel Arterial Physiologic Lower Extremity Evaluation   ---------------------------------------------------------------------------   History: S/p LT SFA , popliteal and TANIA angioplasty.   ----------------------------------------------------------------------------   Impressions   - Right: Moderate lower extremity arterial disease with biphasic pedal     waveforms. Non compressible/calcified vessels render the ankle brachial     index portion of the exam unreliable. The absolute great toe pressure is     29mmHg. No significant change since the prior study done on 06/29/23.   - Left: Moderate lower extremity arterial disease with biphasic pedal     waveforms. Non compressible/calcified vessels render the ankle brachial     index portion of the exam unreliable. The absolute great toe pressure is     35mmHg. Duplex imaging demonstrates moderate diffuse calcification from     the common femoral to the popliteal artery. Elevated velcoities suggesting     hemodynamically significant stenosis (>50%) of the distal superficial     femoral artery, this is a new finding. Moderate to heavy calcification     with shadowing of the infrapopliteal arteries with limited view, status     post popliteal, SFA and AT angioplasty. There has been a new finding of     elevated SFA velocities when compated to the exam done on 06/29/23.   Encounter Diagnosis  1. Diabetic ulcer of toe of right foot  associated with diabetes mellitus due to underlying condition, limited to breakdown of skin (HCC)      Problem List  Patient Active Problem List   Diagnosis    Type 2 diabetes mellitus with hyperglycemia, with long-term current use of insulin (HCC)    Essential hypertension    Hypothyroidism, unspecified type    Other specified glaucoma    Chest pain, unspecified type    PAD (peripheral artery disease)    Atherosclerosis of artery of extremity with ulceration (HCC)    Unsteady gait when walking    Hyponatremia    Hyperlipidemia, unspecified hyperlipidemia type    Fatigue, unspecified type    Microalbuminuria    Sensorineural hearing loss (SNHL) of both ears    Acute conjunctivitis of both eyes, unspecified acute conjunctivitis type    Constipation, unspecified constipation type    Hospital discharge follow-up    Protein-calorie malnutrition, unspecified severity (HCC)    Chronic midline low back pain without sciatica    Vitamin D deficiency    Anemia, unspecified type    Diabetic ulcer of toe of right foot associated with diabetes mellitus due to underlying condition, limited to breakdown of skin ([facility])     Plan  Orders  Orders Placed This Encounter   Procedures    OP Wound Dressing   Discussed the etiology of the diabetic foot wounds with poor circulation and treatment and management of these wound, including total contact cast, offloading, and advanced dressing.  Continue with betadine with gauze for one more week, try dry gauze for few days if no drainage, he has closed his wound, no dressing is needed.  Recommended soft, warm socks and compression stocking ankle to knee mild (less than 15 mmHg), with leg elevation to reduce edema.   Discussed blood glucose control and the effect of glycemic control on wound healing.  Discussed signs and symptoms of infection, encourage patient to assess skin daily.  Discussed nutrition for wound healing and encourage reduce carbohydrate intake, increase protein intake, and  healthy diabetic diet.  Discussed the use of Cast Cover to protect the ulcer and dressing from shower and possible contamination. Addressed dressing change after shower or when the dressing become wet/contaminated.   Discussed the treatment plan with patient and his son, they verbalized understanding and agreed to these plan.  Total face to face time was 40min, more than 50% of the time was spent in counseling and/or coordination of care related to his diagnosis and plan of care.  Follow-Up  Return if symptoms worsen or fail to improve.            [1]   Allergies  Allergen Reactions    Statins ITCHING and SWELLING    Sulfa Antibiotics RASH

## 2025-07-22 ENCOUNTER — OFFICE VISIT (OUTPATIENT)
Dept: WOUND CARE | Facility: HOSPITAL | Age: 85
End: 2025-07-22
Attending: FAMILY MEDICINE
Payer: MEDICARE

## 2025-07-22 VITALS
TEMPERATURE: 98 F | WEIGHT: 124 LBS | RESPIRATION RATE: 18 BRPM | DIASTOLIC BLOOD PRESSURE: 68 MMHG | HEART RATE: 57 BPM | HEIGHT: 64 IN | BODY MASS INDEX: 21.17 KG/M2 | SYSTOLIC BLOOD PRESSURE: 181 MMHG

## 2025-07-22 DIAGNOSIS — I70.299 ATHEROSCLEROSIS OF ARTERY OF EXTREMITY WITH ULCERATION (HCC): ICD-10-CM

## 2025-07-22 DIAGNOSIS — E11.65 TYPE 2 DIABETES MELLITUS WITH HYPERGLYCEMIA, WITH LONG-TERM CURRENT USE OF INSULIN (HCC): ICD-10-CM

## 2025-07-22 DIAGNOSIS — E08.621 DIABETIC ULCER OF TOE OF RIGHT FOOT ASSOCIATED WITH DIABETES MELLITUS DUE TO UNDERLYING CONDITION, LIMITED TO BREAKDOWN OF SKIN (HCC): Primary | ICD-10-CM

## 2025-07-22 DIAGNOSIS — E46 PROTEIN-CALORIE MALNUTRITION, UNSPECIFIED SEVERITY (HCC): ICD-10-CM

## 2025-07-22 DIAGNOSIS — Z79.4 TYPE 2 DIABETES MELLITUS WITH HYPERGLYCEMIA, WITH LONG-TERM CURRENT USE OF INSULIN (HCC): ICD-10-CM

## 2025-07-22 DIAGNOSIS — I73.9 PAD (PERIPHERAL ARTERY DISEASE): ICD-10-CM

## 2025-07-22 DIAGNOSIS — L97.909 ATHEROSCLEROSIS OF ARTERY OF EXTREMITY WITH ULCERATION (HCC): ICD-10-CM

## 2025-07-22 DIAGNOSIS — L97.511 DIABETIC ULCER OF TOE OF RIGHT FOOT ASSOCIATED WITH DIABETES MELLITUS DUE TO UNDERLYING CONDITION, LIMITED TO BREAKDOWN OF SKIN (HCC): Primary | ICD-10-CM

## 2025-07-22 PROCEDURE — 99213 OFFICE O/P EST LOW 20 MIN: CPT

## 2025-07-22 RX ORDER — HYDRALAZINE HYDROCHLORIDE 25 MG/1
25 TABLET, FILM COATED ORAL 3 TIMES DAILY
COMMUNITY
Start: 2025-07-03

## 2025-07-22 RX ORDER — FUROSEMIDE 20 MG/1
20 TABLET ORAL EVERY OTHER DAY
COMMUNITY
Start: 2025-05-16

## 2025-07-22 RX ORDER — CLOPIDOGREL BISULFATE 75 MG/1
TABLET ORAL
COMMUNITY
Start: 2025-05-28

## 2025-07-22 NOTE — PROGRESS NOTES
Chief Complaint   Patient presents with    Wound Recheck     Patient is here for an initial evaluation and management of his right dorsal great toe.Started a month and a half ago, started as small opening , was infected. Wife states that he has poor blood flow of the leg. CT scan was ordered and done July 16 and he has an appointment with Dr. Lozano (vascular surgeon) on July 24.       HPI:     84-year-old new patient here for evaluation of wound on the right third toe.  He states this started about 6 weeks ago and he had seen podiatry for it.  Patient also was evaluated by vascular surgery who recommended CT angiogram which was done.  Patient previously has had revascularization done to the right lower extremity within the last few years.  With the recent arteriogram that was done patient has to follow-up with vascular surgery to discuss any surgical options.  Currently they are using Betadine to the wound.    Lab Results   Component Value Date    BUN 29.0 (H) 04/08/2022    CREATSERUM 1.19 04/08/2022    GFRCKDEPI 57.75 (L) 04/08/2022    ALB 4.6 04/08/2022    TP 7.0 04/08/2022    A1C 8.6 (H) 04/08/2022       Medications - Current[1]    Allergies[2]         REVIEW OF SYSTEMS:     Review of Systems (ROS)  This information was obtained from the patient, chart    A comprehensive 10 point review of systems was completed.  Pertinent positives and negatives noted in the the HPI.     Past medical, surgical, family and social history updated where appropriate.  Past Medical History[3]  PHYSICAL EXAM:   BP (!) 181/68   Pulse 57   Temp 98.3 °F (36.8 °C)   Resp 18   Ht 64\"   Wt 124 lb (56.2 kg)   BMI 21.28 kg/m²    Estimated body mass index is 21.28 kg/m² as calculated from the following:    Height as of this encounter: 64\".    Weight as of this encounter: 124 lb (56.2 kg).   Vital signs reviewed.Appears stated age, well groomed.        Calf  Point of Measurement - Left Calf: 30  Point of Measurement - Right Calf: 30  Left  Calf from:: Heel  Calf Left cm:: 30.3  Right Calf from:: Heel  Right Calf cm:: 28.9    Ankle  Point of Measurement - Left Ankle: 10  Point of Measurement - Right Ankle: 10  Left Ankle from:: Heel  Left Ankle cm:: 22.2     Right Ankle from:: Heel  Right Ankle cm:: 23.8       Foot           Left Heel to Knee: 40           Right Heel to Knee: 40       Wound 07/22/25 #1 Toe (Comment which one) Right;Dorsal (Active)   Date First Assessed: 07/22/25    Wound Number (Wound Clinic Only): #1  Location: (c) Toe (Comment which one)  Wound Location Orientation: Right;Dorsal      Assessments 7/22/2025  2:22 PM   Wound Image      Drainage Amount Unable to assess   Wound Length (cm) 0.6 cm   Wound Width (cm) 0.8 cm   Wound Surface Area (cm^2) 0.38 cm^2   Wound Depth (cm) 0 cm   Wound Volume (cm^3) 0 cm^3   Margins Well-defined edges   Non-staged Wound Description Full thickness   Holly-wound Assessment Dry;Callous   Wound Bed Eschar (%) 100 %   Wound Odor None   Tunneling? No   Undermining? No   Sinus Tracts? No       No associated orders.              ASSESSMENT AND PLAN:      1. Diabetic ulcer of toe of right foot associated with diabetes mellitus due to underlying condition, limited to breakdown of skin ([facility])    2. Type 2 diabetes mellitus with hyperglycemia, with long-term current use of insulin ([facility])    3. Protein-calorie malnutrition, unspecified severity ([facility])    4. Atherosclerosis of artery of extremity with ulceration ([facility])    5. PAD (peripheral artery disease)    Long discussion with patient and family regarding nature of his third toe wound.  There is eschar at this point and I would recommend continuing Betadine 3 days as he does have very significant peripheral arterial disease and arterial studies were reviewed.  Patient will follow-up with vascular surgery and will await to see if the blister can be restored to some extent and.  Did discuss possibility of debriding the wound and this can cause nonhealing wound to  occur without proper blood supply.  They voiced understanding of this.  Good control of blood sugar is very important as well.  Pressures elevated markedly but patient asymptomatic.  Patient will follow-up with his regular doctor regarding management medications reviewed.  Yesterday patient will return to see me once his vascular flow is restored.      Risks, benefits, and alternatives of current treatment plan discussed in detail.  Questions and concerns addressed. Red flags to RTC or ED reviewed.  Patient (or parent) agrees to plan.      No follow-ups on file.    Also refer to patient instructions.     I spent a total of 50 minutes with this patient's care.  This time included preparing to see the patient (eg, review notes and recent diagnostics), seeing the patient, performing a medically appropriate examination and/or evaluation, counseling and educating the patient, and documenting in the record.          Serafin Kilpatrick M.D.   [facility]   2025    Patient Instructions       PATIENT INSTRUCTIONS      FOR SELENA Cantu 10/1/1940    DATE OF SERVICE: 2025      Betadine to the right third toe wound once a day    Do not get the wound wet    Try to keep the legs down especially if you have pain       Follow up with Dr. Kilpatrick as needed after seeing vascular surgeon and completing their treatment      MISCELLANEOUS INSTRUCTIONS  Supplement with a daily multivitamin   Low salt diet  Intense blood sugar control - Goal Blood sugar below 180 at all times recommended.  Increase protein intake / consider protein supplements - see below  Elevate extremities at all times when sitting / laying down.  No tobacco use     DIETARY MODIFICATIONS TO HELP WITH WOUND HEALING:          Please follow any restrictions to diet given by your other doctors     Protein: meats, beans, eggs, milk and yogurt particularly Greek yogurt), tofu, soy nuts, soy protein products     Vitamin C: citrus fruits  and juices, strawberries, tomatoes, tomato juice, peppers, baked potatoes, spinach, broccoli, cauliflower, [place] sprouts, cabbage     Vitamin A: dark greens, leafy vegetables, orange or yellow vegetables, cantaloupe, fortified dairy products, liver, fortified cereals     Zinc: fortified cereals, red meats, seafood     Consider Errol by [employer] (These are essential branch chain amino acids that help with tissue building and wound healing) and take 2 packets/day. You can order online at [employer] or [employer].      Available at hospital pharmacy as well.      ADDITIONAL REMINDERS:     The treatment plan has been discussed at length with you and your provider. Follow all instructions carefully, it is very important. If you do not follow all instructions, you are at risk of your wound not healing, infection, possible loss of limb and even loss of life.  Please call the clinic at [phone] during regular business hours ( 7:30 AM - 5:30 PM) if you notice increased redness, warmth, pain or pus like drainage or start running a fever greater than 100.3.    For after hour emergencies, please call your primary physician or go to the nearest emergency room.  If your blood pressure is elevated, follow up with your primary care doctor and/or cardiologist as soon as possible.                         MISCELLANEOUS INSTRUCTIONS  Supplement with a daily multivitamin   Low salt diet  Intense blood sugar control - Goal Blood sugar below 180 at all times recommended.  Increase protein intake / consider protein supplements - see below  Elevate extremities at all times when sitting / laying down.  No tobacco use     DIETARY MODIFICATIONS TO HELP WITH WOUND HEALING:          Please follow any restrictions to diet given by your other doctors     Protein: meats, beans, eggs, milk and yogurt particularly Greek yogurt), tofu, soy nuts, soy protein products     Vitamin C: citrus fruits and juices, strawberries, tomatoes, tomato juice,  peppers, baked potatoes, spinach, broccoli, cauliflower, [place] sprouts, cabbage     Vitamin A: dark greens, leafy vegetables, orange or yellow vegetables, cantaloupe, fortified dairy products, liver, fortified cereals     Zinc: fortified cereals, red meats, seafood     Consider Errol by [employer] (These are essential branch chain amino acids that help with tissue building and wound healing) and take 2 packets/day. You can order online at [employer] or [employer]     ADDITIONAL REMINDERS:     The treatment plan has been discussed at length with you and your provider. Follow all instructions carefully, it is very important. If you do not follow all instructions, you are at risk of your wound not healing, infection, possible loss of limb and even loss of life.  Please call the clinic at [phone] during regular business hours ( 7:30 AM - 5:30 PM) if you notice increased redness, warmth, pain or pus like drainage or start running a fever greater than 100.3.    For after hour emergencies, please call your primary physician or go to the nearest emergency room.  If your blood pressure is elevated, follow up with your primary care doctor and/or cardiologist as soon as possible.     FOR LEG SWELLING,  LOWER EXTREMITY WOUNDS AND IF USING COMPRESSION:   Managing your edema:    Avoid prolonged standing in one place. It is better to have your calf muscles moving to pump fluid out of the legs.  Elevate leg(s) above the level of the heart when sitting or as much as possible.  Take you diuretics as directed by your physician. Do not skip doses or change doses unless instructed to do so by your physician.  Decrease salt intake, follow recommended 2 grams daily.  Do not get leg(s) with compression wrap wet. If wraps are too tight as indicated by pain, numbness/tingling or discoloration of toes remove wrap completely and call the wound center @ [phone].       The treatment plan has been discussed at length between you and your  provider. Follow all instructions carefully, it is very important. If you do not follow all instructions you are at risk of your wound not healing, infection, possible loss of limb and even loss of life.    COMPRESSION WRAP PATIENT CARE INSTRUCTIONS  DO NOT get compression wrap wet. When showering, use a shower boot.   Please contact wound clinic and if not able to reach, then go to emergency room if ANY of the following occur:   Tingling or numbness in the foot or toes on leg with compression wrap.  Severe pain that cannot be relieved with elevation and any medication instructed per your doctor.  A fever of 100.4°F (38°C) or higher.  Swelling, coldness, or blue-gray discoloration of the toes.  If the compression wrap feels too tight or too loose.  If the compression wrap is damaged or has rough edges that hurt.  If the compression wrap gets wet, you must cut wrap off.   Drainage from compression wrap that smells different than usual.                        [1]   Current Outpatient Medications:     hydrALAZINE 25 MG Oral Tab, Take 1 tablet (25 mg total) by mouth 3 (three) times daily., Disp: , Rfl:     furosemide 20 MG Oral Tab, Take 1 tablet (20 mg total) by mouth every other day., Disp: , Rfl:     clopidogrel 75 MG Oral Tab, , Disp: , Rfl:     amLODIPine 5 MG Oral Tab, Take 1 tablet (5 mg total) by mouth daily., Disp: , Rfl:     HYDROCHLOROTHIAZIDE 25 MG Oral Tab, TAKE 1 TABLET BY MOUTH ONCE DAILY . APPOINTMENT REQUIRED FOR FUTURE REFILLS, Disp: 90 tablet, Rfl: 0    CLONIDINE 0.2 MG Oral Tab, TAKE 1 TABLET BY MOUTH THREE TIMES DAILY . APPOINTMENT REQUIRED FOR FUTURE REFILLS, Disp: 270 tablet, Rfl: 0    LEVOTHYROXINE 88 MCG Oral Tab, Take 1 tablet by mouth once daily, Disp: 90 tablet, Rfl: 0    metFORMIN 500 MG Oral Tab, Take 2 tablets (1,000 mg total) by mouth 2 (two) times daily with meals., Disp: 360 tablet, Rfl: 3    Olopatadine HCl 0.2 % Ophthalmic Solution, INSTILL 1 DROP INTO EACH EYE ONCE DAILY, Disp: 6 mL,  Rfl: 1    Timolol Hemihydrate 0.5 % Ophthalmic Solution, 1 drop by Each eye route daily., Disp: , Rfl:     aspirin 81 MG Oral Tab, Take 1 tablet (81 mg total) by mouth daily., Disp: , Rfl:     Multiple Vitamins-Minerals (MULTI-VITAMIN/MINERALS) Oral Tab, Take 1 tablet by mouth daily., Disp: , Rfl:     traMADol 50 MG Oral Tab, Take 1-2 tablets ( mg total) by mouth every 4 (four) hours as needed for Pain. (Patient not taking: Reported on 7/22/2025), Disp: 30 tablet, Rfl: 0    VALSARTAN 320 MG Oral Tab, Take 1 tablet by mouth once daily (Patient not taking: Reported on 7/22/2025), Disp: 90 tablet, Rfl: 0    CARVEDILOL 25 MG Oral Tab, Take 1 tablet by mouth once daily, Disp: 90 tablet, Rfl: 0    EZETIMIBE 10 MG Oral Tab, TAKE 1 TABLET BY MOUTH ONCE DAILY . APPOINTMENT REQUIRED FOR FUTURE REFILLS, Disp: 90 tablet, Rfl: 0    glimepiride 2 MG Oral Tab, Take 1 tablet (2 mg total) by mouth 3 (three) times daily. MORNING, NOON, AND BEDTIME, Disp: 270 tablet, Rfl: 1    Glucose Blood (ACCU-CHEK ALESSIA PLUS) In Vitro Strip, Checks blod sugar three times a day dx e11.9, Disp: 100 strip, Rfl: 3    Insulin Pen Needle (BD PEN NEEDLE MINI U/F) 31G X 5 MM Does not apply Misc, Use 1 three times daily, Disp: 100 each, Rfl: 3    BD PEN NEEDLE MINI U/F 31G X 5 MM Does not apply Misc, USE 1  THREE TIMES DAILY, Disp: 100 each, Rfl: 0    DIAZEPAM 5 MG Oral Tab, TAKE 1 TABLET BY MOUTH EVERY 6 HOURS AS NEEDED FOR ANXIETY, Disp: 30 tablet, Rfl: 0    Insulin Syringe-Needle U-100 (BD INSULIN SYRINGE ULTRAFINE) 31G X 5/16\" 0.5 ML Does not apply Misc, Use 3 times aday dx e11.9, Disp: 100 each, Rfl: 3    Accu-Chek Softclix Lancets Does not apply Misc, USE 1 LANCET  TO CHECK GLUCOSE 4 TIMES DAILY dx e11.9, Disp: 300 each, Rfl: 3    ALPHAGAN P 0.1 % Ophthalmic Solution, INSTILL 1 DROP INTO AFFECTED EYE(S) TWICE DAILY, Disp: 5 mL, Rfl: 0    Glucose Blood (ACCU-CHEK ALESSIA PLUS) In Vitro Strip, Use 4 times a day dx e11.9, Disp: 400 strip, Rfl: 3     Glucose Blood (ACCU-CHEK ALESSIA PLUS) In Vitro Strip, Accuchek alessia; Use to test blood sugars three times daily., Disp: 200 strip, Rfl: 3    insulin glargine (LANTUS SOLOSTAR) 100 UNIT/ML Subcutaneous Solution Pen-injector, Inject 10 Units into the skin every morning. Inject 10 units into the skin in the am, Disp: 15 mL, Rfl: 3    Bimatoprost (LUMIGAN) 0.01 % Ophthalmic Solution, Place 1 drop into the right eye every evening., Disp: 3 mL, Rfl: 3    Cholecalciferol 50 MCG (2000 UT) Oral Tab, Take by mouth., Disp: 30 tablet, Rfl: 0    Lancets Does not apply Misc, Use to test blood sugars three times daily., Disp: 200 each, Rfl: 3    Artificial Tear Ointment (LUBRICANT EYE) Ophthalmic Ointment, Apply to eye., Disp: , Rfl:     Carboxymethylcellulose Sodium (REFRESH CELLUVISC OP), Apply 1 drop to eye 5 (five) times daily., Disp: , Rfl:     Ketorolac Tromethamine 0.4 % Ophthalmic Solution, 1 drop 4 (four) times daily., Disp: , Rfl:   [2]   Allergies  Allergen Reactions    Statins ITCHING and SWELLING    Sulfa Antibiotics RASH   [3]   Past Medical History:   Diabetes (HCC)    Esophageal reflux    Essential hypertension    High blood pressure    Hyperlipidemia    Peripheral vascular disease

## 2025-07-22 NOTE — PROGRESS NOTES
Weekly Wound Education Note    Teaching Provided To: Patient  Training Topics: Cleasing and general instructions, Discharge instructions, Dressing  Training Method: Demonstration, Explain/Verbal  Training Response: Reinforcement needed        Notes: Patient here for inital consult to right third toe wound. Patient reports wound started a month and a half ago. Pt see outpatient podiatry - Karin Choudhury. Patient was evaluated by Dr. Lozano vascular- who recommended pt to get CT angio completed (completed exam on 7/16/25 per documentation). Patient states he is scheduled for follow up visit with Dr. Lozano on 7/24/25. Provider recommending betadine to wound covered with bandaid, informed patient to follow up once he has seen vascular depending on if there will be any interventions completed to RLE.

## 2025-07-22 NOTE — PATIENT INSTRUCTIONS
PATIENT INSTRUCTIONS      FOR Pat Mak SELENA 10/1/1940    DATE OF SERVICE: 2025      Betadine to the right third toe wound once a day    Do not get the wound wet    Try to keep the legs down especially if you have pain       Follow up with Dr. Kilpatrick as needed after seeing vascular surgeon and completing their treatment      MISCELLANEOUS INSTRUCTIONS  Supplement with a daily multivitamin   Low salt diet  Intense blood sugar control - Goal Blood sugar below 180 at all times recommended.  Increase protein intake / consider protein supplements - see below  Elevate extremities at all times when sitting / laying down.  No tobacco use     DIETARY MODIFICATIONS TO HELP WITH WOUND HEALING:          Please follow any restrictions to diet given by your other doctors     Protein: meats, beans, eggs, milk and yogurt particularly Greek yogurt), tofu, soy nuts, soy protein products     Vitamin C: citrus fruits and juices, strawberries, tomatoes, tomato juice, peppers, baked potatoes, spinach, broccoli, cauliflower, [place] sprouts, cabbage     Vitamin A: dark greens, leafy vegetables, orange or yellow vegetables, cantaloupe, fortified dairy products, liver, fortified cereals     Zinc: fortified cereals, red meats, seafood     Consider Errol by [employer] (These are essential branch chain amino acids that help with tissue building and wound healing) and take 2 packets/day. You can order online at [employer] or [employer].      Available at hospital pharmacy as well.      ADDITIONAL REMINDERS:     The treatment plan has been discussed at length with you and your provider. Follow all instructions carefully, it is very important. If you do not follow all instructions, you are at risk of your wound not healing, infection, possible loss of limb and even loss of life.  Please call the clinic at [phone] during regular business hours ( 7:30 AM - 5:30 PM) if you notice increased redness, warmth, pain or pus like drainage  or start running a fever greater than 100.3.    For after hour emergencies, please call your primary physician or go to the nearest emergency room.  If your blood pressure is elevated, follow up with your primary care doctor and/or cardiologist as soon as possible.

## 2025-07-29 RX ORDER — TAFLUPROST OPTHALMIC 0 MG/.3ML
SOLUTION/ DROPS OPHTHALMIC NIGHTLY
COMMUNITY

## 2025-07-29 RX ORDER — CYCLOSPORINE 0 G/ML
SOLUTION/ DROPS OPHTHALMIC; TOPICAL 2 TIMES DAILY
COMMUNITY

## 2025-08-05 ENCOUNTER — HOSPITAL ENCOUNTER (OUTPATIENT)
Dept: INTERVENTIONAL RADIOLOGY/VASCULAR | Facility: HOSPITAL | Age: 85
Discharge: HOME | End: 2025-08-05
Attending: SURGERY | Admitting: SURGERY

## 2025-08-05 VITALS
BODY MASS INDEX: 20.69 KG/M2 | OXYGEN SATURATION: 97 % | RESPIRATION RATE: 18 BRPM | SYSTOLIC BLOOD PRESSURE: 173 MMHG | HEIGHT: 64 IN | HEART RATE: 97 BPM | WEIGHT: 121.19 LBS | DIASTOLIC BLOOD PRESSURE: 56 MMHG | TEMPERATURE: 98 F

## 2025-08-05 DIAGNOSIS — I70.299 ATHEROSCLEROSIS OF ARTERY OF EXTREMITY WITH ULCERATION (HCC): ICD-10-CM

## 2025-08-05 DIAGNOSIS — L97.909 ATHEROSCLEROSIS OF ARTERY OF EXTREMITY WITH ULCERATION (HCC): ICD-10-CM

## 2025-08-05 LAB
GLUCOSE BLDC GLUCOMTR-MCNC: 162 MG/DL (ref 70–99)
GLUCOSE BLDC GLUCOMTR-MCNC: 250 MG/DL (ref 70–99)

## 2025-08-05 PROCEDURE — 37230 HC TRANSL ANGIO W STENT TIBIAL PERONEAL UNIL INIT: CPT | Performed by: SURGERY

## 2025-08-05 PROCEDURE — 37224 HC TRANSLUMINAL ANGIOPLASTY FEM POP UNILATERAL: CPT | Performed by: SURGERY

## 2025-08-05 PROCEDURE — 37232 HC TRANSL ANGIOPLASTY TIBIAL PERONEAL UNIL EA ADDL: CPT | Performed by: SURGERY

## 2025-08-05 PROCEDURE — 99153 MOD SED SAME PHYS/QHP EA: CPT | Performed by: SURGERY

## 2025-08-05 PROCEDURE — 99152 MOD SED SAME PHYS/QHP 5/>YRS: CPT | Performed by: SURGERY

## 2025-08-05 PROCEDURE — 36415 COLL VENOUS BLD VENIPUNCTURE: CPT

## 2025-08-05 PROCEDURE — 82962 GLUCOSE BLOOD TEST: CPT

## 2025-08-05 RX ORDER — SODIUM CHLORIDE 9 MG/ML
INJECTION, SOLUTION INTRAVENOUS CONTINUOUS
Status: DISCONTINUED | OUTPATIENT
Start: 2025-08-05 | End: 2025-08-06

## 2025-08-05 RX ORDER — IOPAMIDOL 612 MG/ML
80 INJECTION, SOLUTION INTRAVASCULAR
Status: COMPLETED | OUTPATIENT
Start: 2025-08-05 | End: 2025-08-05

## 2025-08-05 RX ORDER — ACETAMINOPHEN 325 MG/1
650 TABLET ORAL EVERY 4 HOURS PRN
Status: DISCONTINUED | OUTPATIENT
Start: 2025-08-05 | End: 2025-08-06

## 2025-08-05 RX ORDER — HYDRALAZINE HYDROCHLORIDE 20 MG/ML
10 INJECTION INTRAMUSCULAR; INTRAVENOUS ONCE
Status: COMPLETED | OUTPATIENT
Start: 2025-08-05 | End: 2025-08-05

## 2025-08-05 RX ORDER — PROTAMINE SULFATE 10 MG/ML
INJECTION, SOLUTION INTRAVENOUS
Status: COMPLETED
Start: 2025-08-05 | End: 2025-08-05

## 2025-08-05 RX ORDER — MIDAZOLAM HYDROCHLORIDE 1 MG/ML
INJECTION INTRAMUSCULAR; INTRAVENOUS
Status: COMPLETED
Start: 2025-08-05 | End: 2025-08-05

## 2025-08-05 RX ORDER — LABETALOL HYDROCHLORIDE 5 MG/ML
INJECTION, SOLUTION INTRAVENOUS
Status: COMPLETED
Start: 2025-08-05 | End: 2025-08-05

## 2025-08-05 RX ORDER — LIDOCAINE HYDROCHLORIDE 20 MG/ML
INJECTION, SOLUTION EPIDURAL; INFILTRATION; INTRACAUDAL; PERINEURAL
Status: COMPLETED
Start: 2025-08-05 | End: 2025-08-05

## 2025-08-05 RX ORDER — HEPARIN SODIUM 1000 [USP'U]/ML
INJECTION, SOLUTION INTRAVENOUS; SUBCUTANEOUS
Status: COMPLETED
Start: 2025-08-05 | End: 2025-08-05

## 2025-08-05 RX ORDER — HYDRALAZINE HYDROCHLORIDE 20 MG/ML
INJECTION INTRAMUSCULAR; INTRAVENOUS
Status: COMPLETED
Start: 2025-08-05 | End: 2025-08-05

## 2025-08-05 RX ORDER — HYDROCODONE BITARTRATE AND ACETAMINOPHEN 5; 325 MG/1; MG/1
1 TABLET ORAL EVERY 4 HOURS PRN
Status: DISCONTINUED | OUTPATIENT
Start: 2025-08-05 | End: 2025-08-06

## 2025-08-05 RX ORDER — HYDROCODONE BITARTRATE AND ACETAMINOPHEN 5; 325 MG/1; MG/1
2 TABLET ORAL EVERY 4 HOURS PRN
Status: DISCONTINUED | OUTPATIENT
Start: 2025-08-05 | End: 2025-08-06

## 2025-08-05 RX ADMIN — HYDRALAZINE HYDROCHLORIDE 10 MG: 20 INJECTION INTRAMUSCULAR; INTRAVENOUS at 19:28:00

## 2025-08-05 RX ADMIN — IOPAMIDOL 80 ML: 612 INJECTION, SOLUTION INTRAVASCULAR at 14:01:00

## 2025-08-05 RX ADMIN — SODIUM CHLORIDE: 9 INJECTION, SOLUTION INTRAVENOUS at 16:42:00

## 2025-08-12 ENCOUNTER — OFFICE VISIT (OUTPATIENT)
Dept: WOUND CARE | Facility: HOSPITAL | Age: 85
End: 2025-08-12
Attending: FAMILY MEDICINE

## 2025-08-12 VITALS
DIASTOLIC BLOOD PRESSURE: 75 MMHG | SYSTOLIC BLOOD PRESSURE: 193 MMHG | TEMPERATURE: 99 F | HEART RATE: 74 BPM | RESPIRATION RATE: 15 BRPM

## 2025-08-12 DIAGNOSIS — L97.909 ATHEROSCLEROSIS OF ARTERY OF EXTREMITY WITH ULCERATION (HCC): ICD-10-CM

## 2025-08-12 DIAGNOSIS — I70.299 ATHEROSCLEROSIS OF ARTERY OF EXTREMITY WITH ULCERATION (HCC): ICD-10-CM

## 2025-08-12 DIAGNOSIS — I73.9 PAD (PERIPHERAL ARTERY DISEASE): ICD-10-CM

## 2025-08-12 DIAGNOSIS — L97.511 DIABETIC ULCER OF TOE OF RIGHT FOOT ASSOCIATED WITH DIABETES MELLITUS DUE TO UNDERLYING CONDITION, LIMITED TO BREAKDOWN OF SKIN (HCC): Primary | ICD-10-CM

## 2025-08-12 DIAGNOSIS — E08.621 DIABETIC ULCER OF TOE OF RIGHT FOOT ASSOCIATED WITH DIABETES MELLITUS DUE TO UNDERLYING CONDITION, LIMITED TO BREAKDOWN OF SKIN (HCC): Primary | ICD-10-CM

## 2025-08-12 DIAGNOSIS — E11.65 TYPE 2 DIABETES MELLITUS WITH HYPERGLYCEMIA, WITH LONG-TERM CURRENT USE OF INSULIN (HCC): ICD-10-CM

## 2025-08-12 DIAGNOSIS — E46 PROTEIN-CALORIE MALNUTRITION, UNSPECIFIED SEVERITY (HCC): ICD-10-CM

## 2025-08-12 DIAGNOSIS — Z79.4 TYPE 2 DIABETES MELLITUS WITH HYPERGLYCEMIA, WITH LONG-TERM CURRENT USE OF INSULIN (HCC): ICD-10-CM

## 2025-08-12 PROCEDURE — 99215 OFFICE O/P EST HI 40 MIN: CPT | Performed by: FAMILY MEDICINE

## 2025-08-13 ENCOUNTER — TELEPHONE (OUTPATIENT)
Dept: WOUND CARE | Facility: HOSPITAL | Age: 85
End: 2025-08-13

## 2025-08-14 ENCOUNTER — TCOM VISIT (OUTPATIENT)
Dept: WOUND CARE | Facility: HOSPITAL | Age: 85
End: 2025-08-14
Attending: FAMILY MEDICINE

## 2025-08-26 ENCOUNTER — APPOINTMENT (OUTPATIENT)
Dept: WOUND CARE | Facility: HOSPITAL | Age: 85
End: 2025-08-26
Attending: FAMILY MEDICINE

## 2025-08-26 ENCOUNTER — OFFICE VISIT (OUTPATIENT)
Dept: WOUND CARE | Facility: HOSPITAL | Age: 85
End: 2025-08-26
Attending: FAMILY MEDICINE

## 2025-08-26 VITALS
HEART RATE: 60 BPM | RESPIRATION RATE: 18 BRPM | TEMPERATURE: 98 F | DIASTOLIC BLOOD PRESSURE: 67 MMHG | SYSTOLIC BLOOD PRESSURE: 174 MMHG

## 2025-08-26 DIAGNOSIS — E08.621 DIABETIC ULCER OF TOE OF RIGHT FOOT ASSOCIATED WITH DIABETES MELLITUS DUE TO UNDERLYING CONDITION, LIMITED TO BREAKDOWN OF SKIN (HCC): Primary | ICD-10-CM

## 2025-08-26 DIAGNOSIS — Z79.4 TYPE 2 DIABETES MELLITUS WITH HYPERGLYCEMIA, WITH LONG-TERM CURRENT USE OF INSULIN (HCC): ICD-10-CM

## 2025-08-26 DIAGNOSIS — I70.299 ATHEROSCLEROSIS OF ARTERY OF EXTREMITY WITH ULCERATION (HCC): ICD-10-CM

## 2025-08-26 DIAGNOSIS — L97.909 ATHEROSCLEROSIS OF ARTERY OF EXTREMITY WITH ULCERATION (HCC): ICD-10-CM

## 2025-08-26 DIAGNOSIS — I73.9 PAD (PERIPHERAL ARTERY DISEASE): ICD-10-CM

## 2025-08-26 DIAGNOSIS — E11.65 TYPE 2 DIABETES MELLITUS WITH HYPERGLYCEMIA, WITH LONG-TERM CURRENT USE OF INSULIN (HCC): ICD-10-CM

## 2025-08-26 DIAGNOSIS — L97.511 DIABETIC ULCER OF TOE OF RIGHT FOOT ASSOCIATED WITH DIABETES MELLITUS DUE TO UNDERLYING CONDITION, LIMITED TO BREAKDOWN OF SKIN (HCC): Primary | ICD-10-CM

## 2025-08-26 DIAGNOSIS — E46 PROTEIN-CALORIE MALNUTRITION, UNSPECIFIED SEVERITY (HCC): ICD-10-CM

## 2025-08-26 PROCEDURE — 99215 OFFICE O/P EST HI 40 MIN: CPT | Performed by: FAMILY MEDICINE

## 2025-08-28 ENCOUNTER — TELEPHONE (OUTPATIENT)
Dept: WOUND CARE | Facility: HOSPITAL | Age: 85
End: 2025-08-28